# Patient Record
Sex: MALE | Race: WHITE | Employment: FULL TIME | URBAN - METROPOLITAN AREA
[De-identification: names, ages, dates, MRNs, and addresses within clinical notes are randomized per-mention and may not be internally consistent; named-entity substitution may affect disease eponyms.]

---

## 2022-08-30 ENCOUNTER — HOSPITAL ENCOUNTER (EMERGENCY)
Age: 42
Discharge: HOME | End: 2022-08-30
Payer: COMMERCIAL

## 2022-08-30 ENCOUNTER — HOSPITAL ENCOUNTER (EMERGENCY)
Age: 42
Discharge: HOME | End: 2022-08-30
Attending: STUDENT IN AN ORGANIZED HEALTH CARE EDUCATION/TRAINING PROGRAM
Payer: COMMERCIAL

## 2022-08-30 ENCOUNTER — APPOINTMENT (OUTPATIENT)
Dept: GENERAL RADIOLOGY | Age: 42
End: 2022-08-30
Payer: COMMERCIAL

## 2022-08-30 VITALS
DIASTOLIC BLOOD PRESSURE: 80 MMHG | SYSTOLIC BLOOD PRESSURE: 120 MMHG | RESPIRATION RATE: 18 BRPM | OXYGEN SATURATION: 99 % | TEMPERATURE: 98.7 F | HEIGHT: 70 IN | HEART RATE: 100 BPM | WEIGHT: 143 LBS | BODY MASS INDEX: 20.47 KG/M2

## 2022-08-30 VITALS
TEMPERATURE: 97.1 F | DIASTOLIC BLOOD PRESSURE: 76 MMHG | SYSTOLIC BLOOD PRESSURE: 120 MMHG | OXYGEN SATURATION: 96 % | RESPIRATION RATE: 16 BRPM | HEART RATE: 80 BPM

## 2022-08-30 DIAGNOSIS — S61.217A LACERATION OF LEFT LITTLE FINGER WITHOUT DAMAGE TO NAIL, FOREIGN BODY PRESENCE UNSPECIFIED, INITIAL ENCOUNTER: Primary | ICD-10-CM

## 2022-08-30 DIAGNOSIS — S61.209A OPEN WOUND OF FINGER WITH TENDON INJURY, INITIAL ENCOUNTER: ICD-10-CM

## 2022-08-30 DIAGNOSIS — S61.217A LACERATION OF LEFT LITTLE FINGER WITHOUT FOREIGN BODY WITHOUT DAMAGE TO NAIL, INITIAL ENCOUNTER: Primary | ICD-10-CM

## 2022-08-30 PROCEDURE — 90471 IMMUNIZATION ADMIN: CPT | Performed by: PHYSICIAN ASSISTANT

## 2022-08-30 PROCEDURE — 99284 EMERGENCY DEPT VISIT MOD MDM: CPT

## 2022-08-30 PROCEDURE — 99211 OFF/OP EST MAY X REQ PHY/QHP: CPT

## 2022-08-30 PROCEDURE — 90714 TD VACC NO PRESV 7 YRS+ IM: CPT | Performed by: PHYSICIAN ASSISTANT

## 2022-08-30 PROCEDURE — 73130 X-RAY EXAM OF HAND: CPT

## 2022-08-30 PROCEDURE — G0463 HOSPITAL OUTPT CLINIC VISIT: HCPCS

## 2022-08-30 PROCEDURE — 6360000002 HC RX W HCPCS: Performed by: PHYSICIAN ASSISTANT

## 2022-08-30 RX ORDER — LIDOCAINE HYDROCHLORIDE 10 MG/ML
20 INJECTION, SOLUTION INFILTRATION; PERINEURAL ONCE
Status: DISCONTINUED | OUTPATIENT
Start: 2022-08-30 | End: 2022-08-30 | Stop reason: HOSPADM

## 2022-08-30 RX ORDER — TETANUS AND DIPHTHERIA TOXOIDS ADSORBED 2; 2 [LF]/.5ML; [LF]/.5ML
0.5 INJECTION INTRAMUSCULAR ONCE
Status: COMPLETED | OUTPATIENT
Start: 2022-08-30 | End: 2022-08-30

## 2022-08-30 RX ORDER — CEPHALEXIN 500 MG/1
500 CAPSULE ORAL 4 TIMES DAILY
Qty: 28 CAPSULE | Refills: 0 | Status: ON HOLD | OUTPATIENT
Start: 2022-08-30 | End: 2022-09-06 | Stop reason: SDUPTHER

## 2022-08-30 RX ORDER — OXYCODONE HYDROCHLORIDE AND ACETAMINOPHEN 5; 325 MG/1; MG/1
1 TABLET ORAL EVERY 6 HOURS PRN
Qty: 12 TABLET | Refills: 0 | Status: SHIPPED | OUTPATIENT
Start: 2022-08-30 | End: 2022-09-02

## 2022-08-30 RX ADMIN — TETANUS AND DIPHTHERIA TOXOIDS ADSORBED 0.5 ML: 2; 2 INJECTION INTRAMUSCULAR at 14:33

## 2022-08-30 ASSESSMENT — PAIN DESCRIPTION - LOCATION: LOCATION: HAND

## 2022-08-30 ASSESSMENT — PAIN SCALES - GENERAL
PAINLEVEL_OUTOF10: 3
PAINLEVEL_OUTOF10: 4

## 2022-08-30 ASSESSMENT — PAIN - FUNCTIONAL ASSESSMENT: PAIN_FUNCTIONAL_ASSESSMENT: 0-10

## 2022-08-30 ASSESSMENT — PAIN DESCRIPTION - DESCRIPTORS: DESCRIPTORS: ACHING;BURNING;SORE

## 2022-08-30 ASSESSMENT — PAIN DESCRIPTION - ORIENTATION: ORIENTATION: LEFT

## 2022-08-30 NOTE — ED PROVIDER NOTES
[address] [facility]  Department of Emergency Medicine  UC Encounter Note  22   12:56 PM EDT      NAME: Parnell Angelucci  :  1980  MRN:  [id number]    Chief Complaint: Laceration (On left hand-cut it on steel at work )      This is a 81-year-old male the presents to urgent care with a coworker. He states this morning around 1130 he cut his hand on a piece of metal.  He is unsure of his last tetanus shot. He states that he has a laceration to the left fifth finger. He states difficulty moving the finger. He denies other injury. On first contact patient he appears to be in no acute distress. Review of Systems  Pertinent positives and negatives are stated within HPI, all other systems reviewed and are negative. Physical Exam  Skin:     Comments: Approximate 3.5 cm long laceration to the base of the proximal phalanx of the left fifth finger. Wound is very deep. It appears that the flexor tendon has been cut. Also there is bleeding that seems to be coming from a small arterial source. There is no cyanosis of the hand. I do not see any other injury on the hand. The bony structures of the hand appear to be intact. Neurological:      Comments: He does have weakness moving the left fifth finger. Procedures    MDM  Number of Diagnoses or Management Options  Laceration of left little finger without damage to nail, foreign body presence unspecified, initial encounter  Diagnosis management comments: He is in no acute distress. He is not dizzy or lightheaded. Initially I did inject a small amount of lidocaine without epinephrine to the base of the wound for localized anesthesia. He needs the resources of the emergency department. I was able to bandage up the wound and the bleeding is controlled at this time. There is no blood coming out of the bandage. I did speak to Dr. True Van regarding this deep laceration. He will accept the patient. Patient is stable here.   I did speak to the coworker who will drive him over there and he is stable to go by private vehicle I told him to go directly to [facility] for further evaluation and treatment.             --------------------------------------------- PAST HISTORY ---------------------------------------------  Past Medical History:  has no past medical history on file. Past Surgical History:  has no past surgical history on file. Social History:  reports that he has never smoked. He has never used smokeless tobacco.    Family History: family history is not on file. The patients home medications have been reviewed. Allergies: Patient has no known allergies. -------------------------------------------------- RESULTS -------------------------------------------------  No results found for this visit on 08/30/22. No orders to display       ------------------------- NURSING NOTES AND VITALS REVIEWED ---------------------------   The nursing notes within the ED encounter and vital signs as below have been reviewed. /80   Pulse 100   Temp 98.7 °F (37.1 °C)   Resp 18   Ht 5' 10\" (1.778 m)   Wt 143 lb (64.9 kg)   SpO2 99%   BMI 20.52 kg/m²   Oxygen Saturation Interpretation: Normal      ------------------------------------------ PROGRESS NOTES ------------------------------------------   I have spoken with the patient and co-worker  and discussed todays results, in addition to providing specific details for the plan of care and counseling regarding the diagnosis and prognosis. Their questions are answered at this time and they are agreeable with the plan.      --------------------------------- ADDITIONAL PROVIDER NOTES ---------------------------------     This patient is stable for discharge. I have shared the specific conditions for return, as well as the importance of follow-up. * NOTE: This report was transcribed using voice recognition software.  Every effort was made to ensure accuracy; however, inadvertent computerized transcription errors may be present.    --------------------------------- IMPRESSION AND DISPOSITION ---------------------------------    IMPRESSION  1.  Laceration of left little finger without damage to nail, foreign body presence unspecified, initial encounter        DISPOSITION  Disposition: Discharge to ED by car  Patient condition is stable       Rusty Dueñas PA-C  08/30/22 1300

## 2022-08-30 NOTE — ED PROVIDER NOTES
ED Attending shared visit  CC: No     Department of Emergency Medicine   ED  Encounter Note  Admit Date/RoomTime: 2022  1:38 PM  ED Room:     NAME: Valorie Donahue  : 1980  MRN: [id number]     Chief Complaint:  Laceration (Cut finger on piece of steel at work)    History of Present Illness       Kleber Walker [facility] Adilson is a 40 y.o. old male presenting to the emergency department by private vehicle, for a laceration to the proximal aspect of the proximal phalanx of the volar aspect of the left fifth finger , caused by metal edge, which occurred at work approximately 1 hour(s) prior to arrival.  There is not a possibility of retained foreign body in the affected area. Bleeding is not controlled. He takes no blood thinning agents. There is pain at injury site. Tetanus Status:  5 years and 5 days ago. ROS   Pertinent positives and negatives are stated within HPI, all other systems reviewed and are negative. Past Medical History:  has no past medical history on file. Surgical History:  has no past surgical history on file. Social History:  reports that he has never smoked. He has never used smokeless tobacco.    Family History: family history is not on file. Allergies: Patient has no known allergies. Physical Exam  Physical Exam   Oxygen Saturation Interpretation: Normal.        ED Triage Vitals   BP Temp Temp Source Heart Rate Resp SpO2 Height Weight   22 1316 22 1312 22 1312 22 1312 22 1312 22 1312 -- --   130/78 97.1 °F (36.2 °C) Oral (!) 101 20 93 %         Constitutional:  Alert, development consistent with age. HEENT:  NC/NT. Airway patent. Neck:  Normal ROM. Supple. Non-tender. Digits/Fingers:   Left proximal aspect of the proximal phalanx of the volar aspect of the left fifth finger            Tenderness:  severe. Swelling: none. Deformity: no deformity observed/palpated. ROM: unable to flex left 5th finger. Skin:  approximately 2 cm wound present. Neurovascular: Motor deficit: as above. Sensory deficit: decreased sensation distal to the laceration. Pulse deficit: none. Capillary refill: normal.  Hand:  Left all metacarpals            Tenderness:  none. Swelling: none. Deformity: no deformity observed/palpated. Skin:  no wounds, erythema, or swelling. Lymphatics: No lymphangitis or adenopathy noted. Neurological:  Oriented. Motor functions intact. Lab / Imaging Results   (All laboratory and radiology results have been personally reviewed by myself)  Labs:  No results found for this visit on 08/30/22. Imaging: All Radiology results interpreted by Radiologist unless otherwise noted. XR HAND RIGHT (MIN 3 VIEWS)    (Results Pending)     ED Course / Medical Decision Making     Medications   lidocaine 1 % injection 20 mL (has no administration in time range)   diptheria-tetanus toxoids (DECAVAC) 2-2 LF/0.5ML injection 0.5 mL (0.5 mLs IntraMUSCular Given 8/30/22 1433)      Consult(s):   IP CONSULT TO ORTHOPEDIC SURGERY I spoke with the ortho resident who saw the patient in the ED    Procedure(s):    Please refer to ortho note. MDM:   Patient presents to the emergency department for left fifth finger laceration. Both numbness and decreased range of motion to flexion of the affected finger were noticed on physical examination. Ortho was consulted, see their separate note. Patient will be given Keflex for antibiotic prophylaxis and Percocet as needed for pain. Patient educated on new meds to include potential side effects. Worker's Comp. paperwork filled out with restrictions. Patient should follow-up with both Dr. Tran, hand surgery, and corporate care. Wound care discussed. Signs of infection explained.   Strict return precautions were described and he should come back immediately for new or worsening symptoms. Plan of Care/Counseling:  Lbirado Mahan PA-C reviewed today's visit with the patient in addition to providing specific details for the plan of care and counseling regarding the diagnosis and prognosis. Questions are answered at this time and are agreeable with the plan. Assessment     1. Laceration of left little finger without foreign body without damage to nail, initial encounter    2. Open wound of finger with tendon injury, initial encounter      Plan   Discharged home. Patient condition is good    New Medications     New Prescriptions    CEPHALEXIN (KEFLEX) 500 MG CAPSULE    Take 1 capsule by mouth 4 times daily for 7 days    OXYCODONE-ACETAMINOPHEN (PERCOCET) 5-325 MG PER TABLET    Take 1 tablet by mouth every 6 hours as needed for Pain for up to 3 days. Electronically signed by Librado Mahan PA-C   DD: 8/30/22  **This report was transcribed using voice recognition software. Every effort was made to ensure accuracy; however, inadvertent computerized transcription errors may be present.   END OF ED PROVIDER NOTE        Librado Mahan PA-C  08/30/22 9871

## 2022-08-30 NOTE — PROGRESS NOTES
Consult received, laceration with tendon involvement. Awaiting x-rays of the hand. Full consult note to follow.

## 2022-08-30 NOTE — CONSULTS
Department of Orthopedic Surgery  Resident Consult Note          Reason for Consult: Flexor tendon injury, left small finger    HISTORY OF PRESENT ILLNESS:       Patient is a right-hand-dominant 40 y.o. male who presents with an apparent flexor tendon injury. Patient was initially seen at an outlying ED and was transferred to [place], [place] for orthopedic consultation as he was not able to actively flex the finger. He states he was at work and was pulling a heavy metal sheet out for another coworker, when suddenly the metal sheet gave way and lacerated through his protective glove. Laceration is at the base of the small finger on the palmar surface. Minimal active bleeding. Denies numbness/tingling/paresthesias. Denies any other orthopedic complaints at this time. When I arrived in the ED, the patient had already had lidocaine injected locally around the wound. Denies any other injuries. Past Medical History:    History reviewed. No pertinent past medical history. Past Surgical History:    History reviewed. No pertinent surgical history. Current Medications:   Current Facility-Administered Medications: lidocaine 1 % injection 20 mL, 20 mL, IntraDERmal, Once  Allergies:  Patient has no known allergies. Social History:   TOBACCO:   reports that he has never smoked. He has never used smokeless tobacco.  ETOH:   has no history on file for alcohol use. DRUGS:   has no history on file for drug use. ACTIVITIES OF DAILY LIVING:    OCCUPATION:    Family History:   History reviewed. No pertinent family history.     REVIEW OF SYSTEMS:  CONSTITUTIONAL:  negative for  fevers, chills  EYES:  negative for blurred vision, visual disturbance  HEENT:  negative for  hearing loss, voice change  RESPIRATORY:  negative for  dyspnea, wheezing  CARDIOVASCULAR:  negative for  chest pain, palpitations  GASTROINTESTINAL:  negative for nausea, vomiting  GENITOURINARY:  negative for frequency, urinary incontinence  HEMATOLOGIC/LYMPHATIC:  negative for bleeding and petechiae  MUSCULOSKELETAL:  positive for left small finger laceration, inability to flex small finger  NEUROLOGICAL:  negative for headaches, dizziness  BEHAVIOR/PSYCH:  negative for increased agitation and anxiety    PHYSICAL EXAM:    VITALS:  /78   Pulse (!) 101   Temp 97.1 °F (36.2 °C) (Oral)   Resp 18   SpO2 93%   CONSTITUTIONAL:  awake, alert, cooperative, no apparent distress, and appears stated age  MUSCULOSKELETAL:  Left upper Extremity:  2 cm laceration, extending from the palmar ulnar border of the left fifth digit to the palmar fourth webspace within the MCPJ flexion crease. Mild bleeding, controlled. Brisk capillary refill in all digits including the small finger  No active flexion at the MCP, PIP, or DIP joints  No tenodesis effect of the small finger when the wrist is passively extended  With the digit passively flexed, the patient does have full active extension  No gross contamination of the wound  Sensation to light touch is grossly intact on the radial and ulnar aspects of the palmar surface of the digit as well as over the dorsal surface    Secondary Exam:   No injuries on secondary exam    DATA:    CBC: No results found for: WBC, RBC, HGB, HCT, MCV, MCH, MCHC, RDW, PLT, MPV  PT/INR:  No results found for: PROTIME, INR    Radiology Review:  3 views of the left hand reviewed. No acute fractures or dislocations noted. The fifth digit is resting in extension on all images. No apparent osseous abnormalities. IMPRESSION:  Left fifth digit complete zone 2 flexor tendon laceration    PLAN:  The wound was thoroughly irrigated with 1 L of sterile saline. Patient had already had local anesthesia and tolerated wound exploration well without any further anesthetics given. The proximal aspect of the tendon had retracted and was not visible within the wound. There was no apparent arterial injury. No gross contamination.   Once the wound was thoroughly irrigated, 4-0 nylon suture was used to close the skin in a simple interrupted technique. A sterile dressing was then placed consisting of Xeroform, sterile 2 x 2's, and wrapped with Kerlix. The patient was then placed in a well-padded ulnar gutter splint that extended to the tip of the small finger  Initially discussed this patient's care with Dr. Sandra Martel, who recommended follow-up with Dr. Wilder Oliver. After discussion with Dr. Wilder Oliver, the patient was informed that he would follow-up in the orthopedic outpatient office this coming Thursday, 9/1. I explained to the patient the importance of prompt follow-up with Dr. Wilder Oliver, and explained that failure to follow-up with orthopedics and address his injury within the next 2 to 3 weeks could result in permanent finger stiffness, scarring of the tendons, further retraction of the tendon into the palm, and poor functional outcomes. The patient stated that he understood this and would be following up with orthopedics. He will call the office tomorrow to confirm his appointment. Patient okay to be discharged, antibiotics and pain control per ED.   Patient did have his tetanus updated while in the emergency department    Myrna Bailey DO , PGY-2  8/30/22

## 2022-08-30 NOTE — ED NOTES
Dry pressure dressing applied to wound. Patient's co-worker to transport to [address] [place] [address] ER via private vehicle.       Lia Meek LPN  02/60/26 6946

## 2022-09-01 ENCOUNTER — OFFICE VISIT (OUTPATIENT)
Dept: ORTHOPEDIC SURGERY | Age: 42
End: 2022-09-01
Payer: COMMERCIAL

## 2022-09-01 VITALS — BODY MASS INDEX: 20.04 KG/M2 | WEIGHT: 140 LBS | HEIGHT: 70 IN

## 2022-09-01 DIAGNOSIS — S61.219A LACERATION OF FINGER OF LEFT HAND WITH TENDON INVOLVEMENT, INITIAL ENCOUNTER: Primary | ICD-10-CM

## 2022-09-01 DIAGNOSIS — S64.40XA LACERATION OF DIGITAL NERVE OF FINGER, INITIAL ENCOUNTER: ICD-10-CM

## 2022-09-01 PROCEDURE — 99204 OFFICE O/P NEW MOD 45 MIN: CPT | Performed by: ORTHOPAEDIC SURGERY

## 2022-09-01 NOTE — PROGRESS NOTES
Department of Orthopedic Surgery  History and Physical      CHIEF COMPLAINT:  left finger laceration     HISTORY OF PRESENT ILLNESS:                The patient is a 40 y.o. male who presents with laceration to left finger. Occurred at work on 8/30/2022. Patient is right-hand dominant. 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 States that he had a sheet metal get stuck in a press and was attempting to pull it out with a sheet metal shot out and hit the left little digit roughly the MCP joint. Went to [facility] emergency hospital and was evaluated by orthopedic resident at the time. Was diagnosed with a laceration of the flexor tendon. No other injuries at the time. Does endorse some minimal tingling and decreased sensation when compared to contralateral extremity on the volar surface as well as slightly on the ulnar and radial border and tip of the digit. Otherwise denies any other orthopedic complaints. Denies diabetes or tobacco use. Past Medical History:    No past medical history on file. Past Surgical History:    No past surgical history on file. Current Medications:   No current facility-administered medications for this visit. Allergies:  Patient has no known allergies. Social History:   TOBACCO:   reports that he has never smoked. He has never used smokeless tobacco.  ETOH:   has no history on file for alcohol use. DRUGS:   has no history on file for drug use. ACTIVITIES OF DAILY LIVING:    OCCUPATION:    Family History:   No family history on file.     REVIEW OF SYSTEMS:  CONSTITUTIONAL:  negative  EYES:  negative for  visual disturbance  RESPIRATORY:  negative for  dyspnea  CARDIOVASCULAR:  negative for  chest pain  GASTROINTESTINAL:  negative for nausea and vomiting  INTEGUMENT/BREAST:  negative for rash  ENDOCRINE:  negative for diabetic symptoms   MUSCULOSKELETAL:  positive for  pain and decreased range of motion  NEUROLOGICAL:  positive for numbness and tingling    PHYSICAL EXAM:    VITALS:  Ht 5' 10\" (1.778 m)   Wt 140 lb (63.5 kg)   BMI 20.09 kg/m²   CONSTITUTIONAL:  awake, alert, cooperative, no apparent distress, and appears stated age  EYES:  Lids and lashes normal, pupils equal, round and reactive to light, extra ocular muscles intact, sclera clear, conjunctiva normal  ENT:  Normocephalic, without obvious abnormality, atraumatic, sinuses nontender on palpation, external ears without lesions, oral pharynx with moist mucus membranes, tonsils without erythema or exudates, gums normal and good dentition. NECK:  Supple, symmetrical, trachea midline, no adenopathy, thyroid symmetric, not enlarged and no tenderness, skin normal  LUNGS:  CTA  CARDIOVASCULAR:  2+ radial pulses, extremities warm and well perfused  ABDOMEN:  not distended, NTTP  CHEST:  Atraumatic   GENITAL/URINARY:  deferred  NEUROLOGIC:  Awake, alert, oriented to name, place and time. Cranial nerves II-XII are grossly intact. Motor is 5 out of 5 bilaterally. Sensory is intact.  gait is normal.  MUSCULOSKELETAL:    Non-tender about the shoulder and elbow with good ROM. - tinels of the cubital tunnel, - tinels of the carpal tunnel, - durkans, - finkelsteins, - CMC grind, - tenderness over the A1 pulleys with no active triggering. Patient with laceration has been repaired at zone 2 of the little digit. Roughly 1/2 cm straight through the volar flexion crease at this level. Patient was in no flexion present at the DIP or at the PIP at this level. Full flexion extension of all the other fingers. Decreased sensation on the ulnar border of the little digit and decreased two-point discrimination. Sensation intact on the volar surface and is about the radial surface. .  Radial and ulnar sensation intact in all other digits. - wartenbergs and cross finger testing, APB strength 5/5 with no atrophy. Median, ulnar, radial n intact to light touch. Brisk capillary refill. Gross motor 5/5.       DATA: CBC: No results found for: WBC, RBC, HGB, HCT, MCV, MCH, MCHC, RDW, PLT, MPV  PT/INR:  No results found for: PROTIME, INR    Radiology Review:  Xray: x-rays of the left hand were obtained 8/30/22 and reviewed with the patient. 3 views: Left hand: demonstrate no acute fractures or dislocations, some air indicative of soft tissue injury on the volar surface of the little finger  Impression: No acute fracture dislocations    IMPRESSION:  Left zone 2 flexor tendon injury with associated 1cm laceration on the volar surface  Associated ulnar neurovascular injury little finger  Work related injury    PLAN:  Will require operative intervention. Discussed this with patient is in agreement. Patient placed back into ulnar gutter splint. Maintain non weight bearing to left upper extremity. Electronically signed by Allison Vallecillo DO on 9/1/2022 at 10:05 AM     I have seen and evaluated the patient and agree with the above assessment and plan on today's visit. I have performed the key components of the history and physical examination with significant findings of left little finger zone 2 flexor tendon laceration with ulnar neurovascular bundle injury. His findings were explained. Recommended surgical intervention for tendon repair and nerve repair as needed. Postop course was explained including skilled therapy need for compliance with restrictions and possible need for additional surgery. . I concur with the findings and plan as documented. I explained the risks, benefits, alternatives and complications of surgery with the patient including but not limited to the risks of infection, possible damage to nerves, vessels, or tendons, stiffness, loss of range of motion, scar sensitivity, wound healing complications, worsening symptoms, possible need for therapy, as well as the possible need further surgery and unanticipated complications. The patient voiced understanding and all questions were answered.  The patient elected to proceed with surgical intervention.      Tash Valles MD  9/1/2022

## 2022-09-02 ENCOUNTER — PREP FOR PROCEDURE (OUTPATIENT)
Dept: ORTHOPEDIC SURGERY | Age: 42
End: 2022-09-02

## 2022-09-02 DIAGNOSIS — S64.40XA LACERATION OF DIGITAL NERVE OF FINGER, INITIAL ENCOUNTER: ICD-10-CM

## 2022-09-02 DIAGNOSIS — S61.219A LACERATION OF FINGER OF LEFT HAND WITH TENDON INVOLVEMENT, INITIAL ENCOUNTER: Primary | ICD-10-CM

## 2022-09-02 RX ORDER — SODIUM CHLORIDE 9 MG/ML
INJECTION, SOLUTION INTRAVENOUS PRN
Status: CANCELLED | OUTPATIENT
Start: 2022-09-02

## 2022-09-02 RX ORDER — SODIUM CHLORIDE 0.9 % (FLUSH) 0.9 %
5-40 SYRINGE (ML) INJECTION PRN
Status: CANCELLED | OUTPATIENT
Start: 2022-09-02

## 2022-09-02 RX ORDER — SODIUM CHLORIDE 0.9 % (FLUSH) 0.9 %
5-40 SYRINGE (ML) INJECTION EVERY 12 HOURS SCHEDULED
Status: CANCELLED | OUTPATIENT
Start: 2022-09-02

## 2022-09-02 RX ORDER — SODIUM CHLORIDE 9 MG/ML
INJECTION, SOLUTION INTRAVENOUS CONTINUOUS
Status: CANCELLED | OUTPATIENT
Start: 2022-09-02

## 2022-09-02 NOTE — PROGRESS NOTES
Mp PRE-ADMISSION TESTING INSTRUCTIONS    The Preadmission Testing patient is instructed accordingly using the following criteria (check applicable):    ARRIVAL INSTRUCTIONS:  [x] Parking the day of Surgery is located in the Main Entrance lot. Upon entering the door, make an immediate right to the surgery reception desk    [x] Bring photo ID and insurance card    [x] Bring in a copy of Living will or Durable Power of  papers. [x] Please be sure to arrange for responsible adult to provide transportation to and from the hospital    [x] Please arrange for responsible adult to be with you for the 24 hour period post procedure due to having anesthesia    [x] If you awake am of surgery not feeling well or have temperature >100 please call [phone]    GENERAL INSTRUCTIONS:    [x] Nothing by mouth after midnight, including gum, candy, mints or water    [x] You may brush your teeth, but do not swallow any water    [] Take medications as instructed with 1-2 oz of water    [x] Stop herbal supplements and vitamins 5 days prior to procedure    [] Follow preop dosing of blood thinners per physician instructions    [] Take 1/2 dose of evening insulin, but no insulin after midnight    [] No oral diabetic medications after midnight    [] If diabetic and have low blood sugar or feel symptomatic, take 1-2oz apple juice only    [] Bring inhalers day of surgery    [] Bring C-PAP/ Bi-Pap day of surgery    [] Bring urine specimen day of surgery    [x] Shower or bath with soap, lather and rinse well, AM of Surgery, no lotion, powders or creams to surgical site    [] Follow bowel prep as instructed per surgeon    [x] No tobacco products within 24 hours of surgery     [x] No alcohol or illegal drug use within 24 hours of surgery.     [x] Jewelry, body piercing's, eyeglasses, contact lenses and dentures are not permitted into surgery (bring cases)      [x] Please do not wear any nail polish, make up or hair products on the day of surgery    [x] You can expect a call the business day prior to procedure to notify you if your arrival time changes    [x] If you receive a survey after surgery we would greatly appreciate your comments    [] Parent/guardian of a minor must accompany their child and remain on the premises  the entire time they are under our care     [] Pediatric patients may bring favorite toy, blanket or comfort item with them    [] A caregiver or family member must remain with the patient during their stay if they are mentally handicapped, have dementia, disoriented or unable to use a call light or would be a safety concern if left unattended    [x] Please notify surgeon if you develop any illness between now and time of surgery (cold, cough, sore throat, fever, nausea, vomiting) or any signs of infections  including skin, wounds, and dental.    [x]  The Outpatient Pharmacy is available to fill your prescription here on your day of surgery, ask your preop nurse for details    [] Other instructions    EDUCATIONAL MATERIALS PROVIDED:    [] PAT Preoperative Education Packet/Booklet     [] Medication List    [] Transfusion bracelet applied with instructions    [] Shower with soap, lather and rinse well, and use CHG wipes provided the evening before surgery as instructed    [] Incentive spirometer with instructions

## 2022-09-03 ENCOUNTER — ANESTHESIA EVENT (OUTPATIENT)
Dept: OPERATING ROOM | Age: 42
End: 2022-09-03
Payer: COMMERCIAL

## 2022-09-06 ENCOUNTER — HOSPITAL ENCOUNTER (OUTPATIENT)
Age: 42
Setting detail: OUTPATIENT SURGERY
Discharge: HOME | End: 2022-09-06
Attending: ORTHOPAEDIC SURGERY | Admitting: ORTHOPAEDIC SURGERY
Payer: COMMERCIAL

## 2022-09-06 ENCOUNTER — ANESTHESIA (OUTPATIENT)
Dept: OPERATING ROOM | Age: 42
End: 2022-09-06
Payer: COMMERCIAL

## 2022-09-06 VITALS
HEIGHT: 70 IN | TEMPERATURE: 98.1 F | SYSTOLIC BLOOD PRESSURE: 115 MMHG | RESPIRATION RATE: 16 BRPM | OXYGEN SATURATION: 99 % | DIASTOLIC BLOOD PRESSURE: 69 MMHG | WEIGHT: 140 LBS | HEART RATE: 84 BPM | BODY MASS INDEX: 20.04 KG/M2

## 2022-09-06 DIAGNOSIS — G89.18 POST-OPERATIVE PAIN: Primary | ICD-10-CM

## 2022-09-06 PROCEDURE — 64831 REPAIR OF DIGIT NERVE: CPT | Performed by: ORTHOPAEDIC SURGERY

## 2022-09-06 PROCEDURE — 6360000002 HC RX W HCPCS: Performed by: NURSE ANESTHETIST, CERTIFIED REGISTERED

## 2022-09-06 PROCEDURE — 7100000000 HC PACU RECOVERY - FIRST 15 MIN: Performed by: ORTHOPAEDIC SURGERY

## 2022-09-06 PROCEDURE — 3600000012 HC SURGERY LEVEL 2 ADDTL 15MIN: Performed by: ORTHOPAEDIC SURGERY

## 2022-09-06 PROCEDURE — 2709999900 HC NON-CHARGEABLE SUPPLY: Performed by: ORTHOPAEDIC SURGERY

## 2022-09-06 PROCEDURE — 7100000001 HC PACU RECOVERY - ADDTL 15 MIN: Performed by: ORTHOPAEDIC SURGERY

## 2022-09-06 PROCEDURE — 2580000003 HC RX 258: Performed by: NURSE ANESTHETIST, CERTIFIED REGISTERED

## 2022-09-06 PROCEDURE — 3600000002 HC SURGERY LEVEL 2 BASE: Performed by: ORTHOPAEDIC SURGERY

## 2022-09-06 PROCEDURE — 7100000011 HC PHASE II RECOVERY - ADDTL 15 MIN: Performed by: ORTHOPAEDIC SURGERY

## 2022-09-06 PROCEDURE — 3700000000 HC ANESTHESIA ATTENDED CARE: Performed by: ORTHOPAEDIC SURGERY

## 2022-09-06 PROCEDURE — 2500000003 HC RX 250 WO HCPCS: Performed by: NURSE ANESTHETIST, CERTIFIED REGISTERED

## 2022-09-06 PROCEDURE — 7100000010 HC PHASE II RECOVERY - FIRST 15 MIN: Performed by: ORTHOPAEDIC SURGERY

## 2022-09-06 PROCEDURE — 6360000002 HC RX W HCPCS: Performed by: PHYSICIAN ASSISTANT

## 2022-09-06 PROCEDURE — 2500000003 HC RX 250 WO HCPCS: Performed by: ORTHOPAEDIC SURGERY

## 2022-09-06 PROCEDURE — 26356 REPAIR FINGER/HAND TENDON: CPT | Performed by: ORTHOPAEDIC SURGERY

## 2022-09-06 PROCEDURE — 3700000001 HC ADD 15 MINUTES (ANESTHESIA): Performed by: ORTHOPAEDIC SURGERY

## 2022-09-06 PROCEDURE — 2580000003 HC RX 258: Performed by: PHYSICIAN ASSISTANT

## 2022-09-06 PROCEDURE — 69990 MICROSURGERY ADD-ON: CPT | Performed by: ORTHOPAEDIC SURGERY

## 2022-09-06 RX ORDER — SODIUM CHLORIDE 0.9 % (FLUSH) 0.9 %
5-40 SYRINGE (ML) INJECTION PRN
Status: DISCONTINUED | OUTPATIENT
Start: 2022-09-06 | End: 2022-09-06 | Stop reason: HOSPADM

## 2022-09-06 RX ORDER — DEXAMETHASONE SODIUM PHOSPHATE 4 MG/ML
INJECTION, SOLUTION INTRA-ARTICULAR; INTRALESIONAL; INTRAMUSCULAR; INTRAVENOUS; SOFT TISSUE PRN
Status: DISCONTINUED | OUTPATIENT
Start: 2022-09-06 | End: 2022-09-06 | Stop reason: SDUPTHER

## 2022-09-06 RX ORDER — SODIUM CHLORIDE 9 MG/ML
INJECTION, SOLUTION INTRAVENOUS CONTINUOUS
Status: DISCONTINUED | OUTPATIENT
Start: 2022-09-06 | End: 2022-09-06 | Stop reason: HOSPADM

## 2022-09-06 RX ORDER — LIDOCAINE HYDROCHLORIDE 20 MG/ML
INJECTION, SOLUTION EPIDURAL; INFILTRATION; INTRACAUDAL; PERINEURAL PRN
Status: DISCONTINUED | OUTPATIENT
Start: 2022-09-06 | End: 2022-09-06 | Stop reason: SDUPTHER

## 2022-09-06 RX ORDER — OXYCODONE HYDROCHLORIDE AND ACETAMINOPHEN 5; 325 MG/1; MG/1
1 TABLET ORAL EVERY 6 HOURS PRN
COMMUNITY

## 2022-09-06 RX ORDER — BUPIVACAINE HYDROCHLORIDE 5 MG/ML
INJECTION, SOLUTION EPIDURAL; INTRACAUDAL PRN
Status: DISCONTINUED | OUTPATIENT
Start: 2022-09-06 | End: 2022-09-06 | Stop reason: ALTCHOICE

## 2022-09-06 RX ORDER — ONDANSETRON 2 MG/ML
INJECTION INTRAMUSCULAR; INTRAVENOUS PRN
Status: DISCONTINUED | OUTPATIENT
Start: 2022-09-06 | End: 2022-09-06 | Stop reason: SDUPTHER

## 2022-09-06 RX ORDER — SODIUM CHLORIDE 9 MG/ML
INJECTION, SOLUTION INTRAVENOUS CONTINUOUS PRN
Status: DISCONTINUED | OUTPATIENT
Start: 2022-09-06 | End: 2022-09-06 | Stop reason: SDUPTHER

## 2022-09-06 RX ORDER — SODIUM CHLORIDE 9 MG/ML
INJECTION, SOLUTION INTRAVENOUS PRN
Status: DISCONTINUED | OUTPATIENT
Start: 2022-09-06 | End: 2022-09-06 | Stop reason: HOSPADM

## 2022-09-06 RX ORDER — SODIUM CHLORIDE 0.9 % (FLUSH) 0.9 %
5-40 SYRINGE (ML) INJECTION EVERY 12 HOURS SCHEDULED
Status: DISCONTINUED | OUTPATIENT
Start: 2022-09-06 | End: 2022-09-06 | Stop reason: HOSPADM

## 2022-09-06 RX ORDER — CEPHALEXIN 500 MG/1
500 CAPSULE ORAL 4 TIMES DAILY
COMMUNITY

## 2022-09-06 RX ORDER — PROPOFOL 10 MG/ML
INJECTION, EMULSION INTRAVENOUS PRN
Status: DISCONTINUED | OUTPATIENT
Start: 2022-09-06 | End: 2022-09-06 | Stop reason: SDUPTHER

## 2022-09-06 RX ORDER — MIDAZOLAM HYDROCHLORIDE 1 MG/ML
INJECTION INTRAMUSCULAR; INTRAVENOUS PRN
Status: DISCONTINUED | OUTPATIENT
Start: 2022-09-06 | End: 2022-09-06 | Stop reason: SDUPTHER

## 2022-09-06 RX ORDER — OXYCODONE HYDROCHLORIDE AND ACETAMINOPHEN 5; 325 MG/1; MG/1
1 TABLET ORAL EVERY 6 HOURS PRN
Qty: 28 TABLET | Refills: 0 | Status: SHIPPED | OUTPATIENT
Start: 2022-09-06 | End: 2022-09-13

## 2022-09-06 RX ORDER — CEPHALEXIN 500 MG/1
500 CAPSULE ORAL 4 TIMES DAILY
Qty: 28 CAPSULE | Refills: 0 | Status: SHIPPED | OUTPATIENT
Start: 2022-09-06 | End: 2022-09-13

## 2022-09-06 RX ORDER — FENTANYL CITRATE 50 UG/ML
INJECTION, SOLUTION INTRAMUSCULAR; INTRAVENOUS PRN
Status: DISCONTINUED | OUTPATIENT
Start: 2022-09-06 | End: 2022-09-06 | Stop reason: SDUPTHER

## 2022-09-06 RX ORDER — PROCHLORPERAZINE EDISYLATE 5 MG/ML
5 INJECTION INTRAMUSCULAR; INTRAVENOUS
Status: DISCONTINUED | OUTPATIENT
Start: 2022-09-06 | End: 2022-09-06 | Stop reason: HOSPADM

## 2022-09-06 RX ADMIN — DEXAMETHASONE SODIUM PHOSPHATE 10 MG: 4 INJECTION, SOLUTION INTRAMUSCULAR; INTRAVENOUS at 09:10

## 2022-09-06 RX ADMIN — CEFAZOLIN 2000 MG: 2 INJECTION, POWDER, FOR SOLUTION INTRAMUSCULAR; INTRAVENOUS at 09:05

## 2022-09-06 RX ADMIN — SODIUM CHLORIDE: 9 INJECTION, SOLUTION INTRAVENOUS at 09:00

## 2022-09-06 RX ADMIN — PROPOFOL 50 MG: 10 INJECTION, EMULSION INTRAVENOUS at 09:19

## 2022-09-06 RX ADMIN — LIDOCAINE HYDROCHLORIDE 100 MG: 20 INJECTION, SOLUTION EPIDURAL; INFILTRATION; INTRACAUDAL; PERINEURAL at 09:10

## 2022-09-06 RX ADMIN — PROPOFOL 250 MG: 10 INJECTION, EMULSION INTRAVENOUS at 09:10

## 2022-09-06 RX ADMIN — FENTANYL CITRATE 100 MCG: 50 INJECTION, SOLUTION INTRAMUSCULAR; INTRAVENOUS at 09:10

## 2022-09-06 RX ADMIN — MIDAZOLAM 2 MG: 1 INJECTION INTRAMUSCULAR; INTRAVENOUS at 09:05

## 2022-09-06 RX ADMIN — ONDANSETRON 4 MG: 2 INJECTION INTRAMUSCULAR; INTRAVENOUS at 09:10

## 2022-09-06 ASSESSMENT — PAIN SCALES - GENERAL
PAINLEVEL_OUTOF10: 0

## 2022-09-06 ASSESSMENT — PAIN - FUNCTIONAL ASSESSMENT: PAIN_FUNCTIONAL_ASSESSMENT: 0-10

## 2022-09-06 NOTE — ANESTHESIA PRE PROCEDURE
Department of Anesthesiology  Preprocedure Note       Name:  Radha Bishop   Age:  40 y.o.  :  1980                                          MRN:  [id number]         Date:  2022      Surgeon: Bernadette Agustin):  Erasmo Yanes MD    Procedure: Procedure(s):  LEFT SMALL FINGER FLEXOR TENDON AND NERVE REPAIR AS NEEDED    (MICROINSTRUMENTS)    Medications prior to admission:   Prior to Admission medications    Medication Sig Start Date End Date Taking? Authorizing Provider   cephALEXin (KEFLEX) 500 MG capsule Take 1 capsule by mouth 4 times daily for 7 days 22 Yes YOHAN Sherwood   oxyCODONE-acetaminophen (PERCOCET) 5-325 MG per tablet Take 1 tablet by mouth every 6 hours as needed for Pain for up to 7 days. 22 Yes YOHAN Wade   cephALEXin (KEFLEX) 500 MG capsule Take 500 mg by mouth 4 times daily   Yes Historical Provider, MD   oxyCODONE-acetaminophen (PERCOCET) 5-325 MG per tablet Take 1 tablet by mouth every 6 hours as needed for Pain. Yes Historical Provider, MD       Current medications:    Current Facility-Administered Medications   Medication Dose Route Frequency Provider Last Rate Last Admin    0.9 % sodium chloride infusion   IntraVENous Continuous YOHAN Sherwood   New Bag at [phone]    sodium chloride flush 0.9 % injection 5-40 mL  5-40 mL IntraVENous 2 times per day YOHAN Sherwood        sodium chloride flush 0.9 % injection 5-40 mL  5-40 mL IntraVENous PRN YOHAN Sherwood        0.9 % sodium chloride infusion   IntraVENous PRN YOHAN Sherwood        ceFAZolin (ANCEF) 2,000 mg in sterile water 20 mL IV syringe  2,000 mg IntraVENous On Call to [address] YOH[address]           Allergies:  No Known Allergies    Problem List:  There is no problem list on file for this patient.       Past Medical History:        Diagnosis Date    Finger laceration involving tendon     left small finger with tendon and nerve involvement       Past Surgical History: History reviewed. No pertinent surgical history. Social History:    Social History     Tobacco Use    Smoking status: Never    Smokeless tobacco: Never   Substance Use Topics    Alcohol use: Never                                Counseling given: Not Answered      Vital Signs (Current):   Vitals:    09/02/22 1234 09/06/22 0840   BP:  117/72   Pulse:  90   Resp:  18   Temp:  98 °F (36.7 °C)   TempSrc:  Temporal   SpO2:  100%   Weight: 140 lb (63.5 kg) 140 lb (63.5 kg)   Height: 5' 10\" (1.778 m) 5' 10\" (1.778 m)                                              BP Readings from Last 3 Encounters:   09/06/22 117/72   08/30/22 120/76   08/30/22 120/80       NPO Status: Time of last liquid consumption: 2230                        Time of last solid consumption: 1900                        Date of last liquid consumption: 09/05/22                        Date of last solid food consumption: 09/05/22    BMI:   Wt Readings from Last 3 Encounters:   09/06/22 140 lb (63.5 kg)   09/01/22 140 lb (63.5 kg)   08/30/22 143 lb (64.9 kg)     Body mass index is 20.09 kg/m². CBC: No results found for: WBC, RBC, HGB, HCT, MCV, RDW, PLT    CMP: No results found for: NA, K, CL, CO2, BUN, CREATININE, GFRAA, AGRATIO, LABGLOM, GLUCOSE, GLU, PROT, CALCIUM, BILITOT, ALKPHOS, AST, ALT    POC Tests: No results for input(s): POCGLU, POCNA, POCK, POCCL, POCBUN, POCHEMO, POCHCT in the last 72 hours.     Coags: No results found for: PROTIME, INR, APTT    HCG (If Applicable): No results found for: PREGTESTUR, PREGSERUM, HCG, HCGQUANT     ABGs: No results found for: PHART, PO2ART, DKC0OVU, DYF9PPQ, BEART, S8FMISQV     Type & Screen (If Applicable):  No results found for: LABABO, LABRH    Drug/Infectious Status (If Applicable):  No results found for: HIV, HEPCAB    COVID-19 Screening (If Applicable): No results found for: COVID19        Anesthesia Evaluation  Patient summary reviewed and Nursing notes reviewed no history of anesthetic complications:

## 2022-09-06 NOTE — BRIEF OP NOTE
Brief Postoperative Note      Patient: Aaron Adame  YOB: 1980  MRN: [id number]    Date of Procedure: 9/6/2022    Pre-Op Diagnosis: Laceration of finger of left hand, foreign body presence unspecified, nail damage status unspecified, unspecified finger, initial encounter [S61.219A]    Post-Op Diagnosis: Same       Procedure(s):  LEFT SMALL FINGER FLEXOR TENDON AND NERVE REPAIR AS NEEDED    (MICROINSTRUMENTS)    Surgeon(s):  Edilma Roy MD    Assistant:  Physician Assistant: YOHAN Lr  Resident:  Daryle Loots, DO    Anesthesia: General    Estimated Blood Loss (mL): Minimal    Complications: None    Specimens:   * No specimens in log *    Implants:  * No implants in log *      Drains: * No LDAs found *    Findings: see op note    Electronically signed by YOHAN Lr on 9/6/2022 at 10:15 AM

## 2022-09-06 NOTE — ANESTHESIA POSTPROCEDURE EVALUATION
Department of Anesthesiology  Postprocedure Note    Patient: Eliel Lema  MRN: [id number]  [place]: 1980  Date of evaluation: 9/6/2022      Procedure Summary     Date: 09/06/22 Room / Location: [facility] 02 / [address] [place] [address]    Anesthesia Start: 0900 Anesthesia Stop:     Procedure: LEFT SMALL FINGER FLEXOR TENDON AND NERVE REPAIR AS NEEDED    (MICROINSTRUMENTS) (Left) Diagnosis:       Laceration of finger of left hand, foreign body presence unspecified, nail damage status unspecified, unspecified finger, initial encounter      (Laceration of finger of left hand, foreign body presence unspecified, nail damage status unspecified, unspecified finger, initial encounter [O03.138W])    Surgeons: Aristides Isaac MD Responsible Provider: Eric Major MD    Anesthesia Type: general ASA Status: 1          Anesthesia Type: No value filed.     Haresh Phase I: Haresh Score: 10    Haresh Phase II:        Anesthesia Post Evaluation    Patient location during evaluation: PACU  Patient participation: complete - patient participated  Level of consciousness: awake  Airway patency: patent  Nausea & Vomiting: no nausea and no vomiting  Complications: no  Cardiovascular status: hemodynamically stable  Respiratory status: acceptable  Hydration status: euvolemic

## 2022-09-06 NOTE — H&P
Department of Orthopedic Surgery  History and Physical      CHIEF COMPLAINT:  left finger laceration     HISTORY OF PRESENT ILLNESS:                The patient is a 40 y.o. male who presents with laceration to left finger. Occurred at work on 8/30/2022. Patient is right-hand dominant. 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 States that he had a sheet metal get stuck in a press and was attempting to pull it out with a sheet metal shot out and hit the left little digit roughly the MCP joint. Went to [facility] emergency hospital and was evaluated by orthopedic resident at the time. Was diagnosed with a laceration of the flexor tendon. No other injuries at the time. Does endorse some minimal tingling and decreased sensation when compared to contralateral extremity on the volar surface as well as slightly on the ulnar and radial border and tip of the digit. Otherwise denies any other orthopedic complaints. Denies diabetes or tobacco use. Past Medical History:        Diagnosis Date    Finger laceration involving tendon     left small finger with tendon and nerve involvement     Past Surgical History:    History reviewed. No pertinent surgical history. Current Medications:   Current Facility-Administered Medications: 0.9 % sodium chloride infusion, , IntraVENous, Continuous  sodium chloride flush 0.9 % injection 5-40 mL, 5-40 mL, IntraVENous, 2 times per day  sodium chloride flush 0.9 % injection 5-40 mL, 5-40 mL, IntraVENous, PRN  0.9 % sodium chloride infusion, , IntraVENous, PRN  ceFAZolin (ANCEF) 2,000 mg in sterile water 20 mL IV syringe, 2,000 mg, IntraVENous, On Call to OR  Allergies:  Patient has no known allergies. Social History:   TOBACCO:   reports that he has never smoked. He has never used smokeless tobacco.  ETOH:   reports no history of alcohol use. DRUGS:   reports no history of drug use.   ACTIVITIES OF DAILY LIVING:    OCCUPATION:    Family History:   History reviewed. No pertinent family history. REVIEW OF SYSTEMS:  CONSTITUTIONAL:  negative  EYES:  negative for  visual disturbance  RESPIRATORY:  negative for  dyspnea  CARDIOVASCULAR:  negative for  chest pain  GASTROINTESTINAL:  negative for nausea and vomiting  INTEGUMENT/BREAST:  negative for rash  ENDOCRINE:  negative for diabetic symptoms   MUSCULOSKELETAL:  positive for  pain and decreased range of motion  NEUROLOGICAL:  positive for numbness and tingling    PHYSICAL EXAM:    VITALS:  /72   Pulse 90   Resp 18   Ht 5' 10\" (1.778 m)   Wt 140 lb (63.5 kg)   SpO2 100%   BMI 20.09 kg/m²   CONSTITUTIONAL:  awake, alert, cooperative, no apparent distress, and appears stated age  EYES:  Lids and lashes normal, pupils equal, round and reactive to light, extra ocular muscles intact, sclera clear, conjunctiva normal  ENT:  Normocephalic, without obvious abnormality, atraumatic, sinuses nontender on palpation, external ears without lesions, oral pharynx with moist mucus membranes, tonsils without erythema or exudates, gums normal and good dentition. NECK:  Supple, symmetrical, trachea midline, no adenopathy, thyroid symmetric, not enlarged and no tenderness, skin normal  LUNGS:  CTA  CARDIOVASCULAR:  2+ radial pulses, extremities warm and well perfused  ABDOMEN:  not distended, NTTP  CHEST:  Atraumatic   GENITAL/URINARY:  deferred  NEUROLOGIC:  Awake, alert, oriented to name, place and time. Cranial nerves II-XII are grossly intact. Motor is 5 out of 5 bilaterally. Sensory is intact.  gait is normal.  MUSCULOSKELETAL:    Non-tender about the shoulder and elbow with good ROM. - tinels of the cubital tunnel, - tinels of the carpal tunnel, - durkans, - finkelsteins, - CMC grind, - tenderness over the A1 pulleys with no active triggering. Patient with laceration has been repaired at zone 2 of the little digit. Roughly 1/2 cm straight through the volar flexion crease at this level.   Patient was in no flexion present at the DIP or at the PIP at this level. Full flexion extension of all the other fingers. Decreased sensation on the ulnar border of the little digit and decreased two-point discrimination. Sensation intact on the volar surface and is about the radial surface. .  Radial and ulnar sensation intact in all other digits. - wartenbergs and cross finger testing, APB strength 5/5 with no atrophy. Median, ulnar, radial n intact to light touch. Brisk capillary refill. Gross motor 5/5. DATA:    CBC: No results found for: WBC, RBC, HGB, HCT, MCV, MCH, MCHC, RDW, PLT, MPV  PT/INR:  No results found for: PROTIME, INR    Radiology Review:  Xray: x-rays of the left hand were obtained 8/30/22 and reviewed with the patient. 3 views: Left hand: demonstrate no acute fractures or dislocations, some air indicative of soft tissue injury on the volar surface of the little finger  Impression: No acute fracture dislocations    IMPRESSION:  Left zone 2 flexor tendon injury with associated 1cm laceration on the volar surface  Associated ulnar neurovascular injury little finger  Work related injury    PLAN:  Will require operative intervention. Discussed this with patient is in agreement. Patient placed back into ulnar gutter splint. Maintain non weight bearing to left upper extremity. Electronically signed by Juan Luis Hughes DO on 9/6/2022 at 8:44 AM     I have seen and evaluated the patient and agree with the above assessment and plan on today's visit. I have performed the key components of the history and physical examination with significant findings of left little finger zone 2 flexor tendon laceration with ulnar neurovascular bundle injury. His findings were explained. Recommended surgical intervention for tendon repair and nerve repair as needed. Postop course was explained including skilled therapy need for compliance with restrictions and possible need for additional surgery. . I concur with the findings and

## 2022-09-06 NOTE — PROGRESS NOTES
CLINICAL PHARMACY NOTE: MEDS TO BEDS    Total # of Prescriptions Filled: 2   The following medications were delivered to the patient:  Percocet 5/325  Keflex     Additional Documentation:  Patient took kefkex script back.  Said the dr said they no longer needed the antibiotic just the pain med

## 2022-09-06 NOTE — DISCHARGE INSTRUCTIONS
DISCHARGE INSTRUCTIONS TO HOME FOLLOWING SURGERY    YOU NEED TO START THERAPY WITHIN A FEW DAYS. CONTACT OUR OFFICE IF THIS IS NOT SET UP    ACTIVITY INSTRUCTIONS:    Elevate extremity to help reduce swelling. Use Purple Pillow for elevation of hand/arm   Use sling as needed. No heavy lifting, pushing, pulling or strenuous activity    WOUND/DRESSING INSTRUCTIONS:  Always ensure you and your care giver clean hands before and after caring for the wound. Keep the dressing, cast, or splint clean and dry until seen in THERAPY. OK to shower- Keep your dressing dry. Can ice surgical region to help reduce swelling, Do not apply ice to fingers or toes       MEDICATION INSTRUCTIONS:  Take pain medicine as directed. When taking pain medications, you may experience dizziness or drowsiness. Do not drink alcohol or drive when taking these medications. Do not take any other medication containing acetaminophen (Tylenol) while taking the prescribed pain medication. Ibuprofen, Aleve, Motrin, or Naproxen are okay but should not be taken on an empty stomach. *Watch for these significant complications:  Call physician if these or any other problems occur:  Fever over 101°, redness, swelling or warmth at the operative site  Unrelieved nausea    Foul smelling or cloudy drainage at the operative site   Unrelieved pain  Breathing issues such as shortness of breath or difficulty breathing    Blood soaked dressing. (Some oozing may be normal)     Numb, pale, blue, cold or tingling extremity       Make an appointment to be seen 8-10 days after surgery  FOLLOW-UP CARE:    Dr. Barbara Marmolejo.  [address]  [address]  [phone]

## 2022-09-07 NOTE — OP NOTE
[address] [address]                                OPERATIVE REPORT    PATIENT NAME: Monica Dickey                      :        1980  MED REC NO:   [id number]                            ROOM:  ACCOUNT NO:   [de-identified]                           ADMIT DATE: 2022  PROVIDER:     Jessie Colin MD    DATE OF PROCEDURE:  2022    PREOPERATIVE DIAGNOSES:  1. Left small finger laceration with flexor digitorum profundus and  superficialis zone II lacerations. 2.  Left ulnar digital neurovascular bundle laceration. POSTOPERATIVE DIAGNOSES:  1. Left small finger laceration with flexor digitorum profundus and  superficialis zone II lacerations. 2.  Left ulnar digital neurovascular bundle laceration. PROCEDURES PERFORMED:  1. Left zone II flexor digitorum profundus repair. 2.  Left zone II flexor digitorum superficialis repair, radial slip. 3.  Left ulnar digital nerve repair using 9-0 epineural repair with use  of intraoperative microscope. ANESTHESIA:  1. General.  2.  Local anesthetic by surgeon consisting of approximately 10 mL of  0.25% Marcaine plain. ASSISTANTS:  1. Nithin Frias, physician assistant certified. She was present  throughout the procedure. Her assistance was used for preoperative  positioning, intraoperative retraction, closure, and dressing  application. Her assistance expedited the case and decreased the  surgical time. 2.   _____ Livan Urbina, orthopedic surgery resident. TOURNIQUET TIME:  40 minutes. BLOOD LOSS:  Minimal.    FINDINGS:  1. Complete laceration of flexor digitorum profundus at the level of  the A3 pulley with proximal retraction of the profundus and  superficialis. 2.  There was complete laceration of the ulnar neurovascular bundle.   3..  Status post zone II flexor digitorum profundus and superficialis  repair, there was smooth excursion of the underlying tendons. The ulnar  slip was removed as this was quite small and diminutive. 4.  Intraoperative microscope was used to repair the ulnar digital  nerve. A tension-free repair was achieved. No tension with digital  range of motion. SPECIMENS:  None. COMPLICATIONS:  None. DISPOSITION:  The patient was stable throughout the procedure. OPERATIVE INDICATIONS:  The patient is a 67-year-old gentleman who  sustained a laceration to his left small finger, seen in the office, has  complete loss of function of the flexor tendons as well as numbness in  the ulnar digital nerve distribution. Risks, benefits, alternatives,  and complications of surgical intervention were explained including, but  not limited to the risk of infection; damage to nerves, vessels, or  tendons; re-rupture; stiffness; adhesions; need for compliance with  postop recommendations including skilled therapy, possible need for  additional surgery. He voiced understanding and wished to proceed. SURGERY IN DETAIL:  The patient was identified in the holding area. The  left small finger was identified as the surgical site. He was then seen  by Anesthesia, taken to the operating room, placed supine on the table,  and underwent general anesthesia per Anesthesia Department. All bony  prominences were well padded. A well-padded arm tourniquet was placed. The left upper extremity was prepped and draped in standard sterile  fashion. Perioperative antibiotics were administered. Arm was  exsanguinated. Tourniquet inflated to 250 mmHg. Total tourniquet time  was approximately 40 minutes. A transverse laceration at the level of the metacarpophalangeal joint  flexion crease was then opened up with removal of the previously placed  Prolene sutures. A Z-plasty was undertaken in a Miguel Carls type fashion  across this level. A loupe magnification was used to identify the ulnar  neurovascular bundle.   This was completely lacerated at the level of the  injury. This bundle was mobilized for later repair. Flaps were  elevated. The radial neurovascular bundle was found to be intact. There was complete loss of the flexor tendons. This occurred just at  the level of the proximal leading edge of the A3 pulley. The A3 pulley  was released distally. The A4 pulley was preserved. Two slips of the  superficialis were present at this level. The profundus was able to be  retrieved with hyperflexion of the distal interphalangeal joint  distally. Hemostat was passed proximally beneath the A2 pulley to  retrieve the flexor tendons without success; therefore, the incision was  extended proximally and the tendons were identified at this level. A  milking maneuver was then used to retrieve the tendons distally. The  ulnar slip of the flexor digitorum superficialis was found to be  significantly deficient. This was resected with a 15-blade scalpel to  smooth margins. The radial superficialis was then repaired with a 4-0  looped FiberWire suture to its radial slip. The profundus was then  mobilized distally. Smooth tendinous edges were then ensured and a  double-locking cruciate anastomosis of 4-0 looped FiberWire was used to  achieve a primary repair of the profundus. Venting was performed  distally to allow full extent of the tendon distally to allow full  digital extension. There was no tension. There was no gapping and  there was smooth excursion of the tendons with range of motion. The microscope was then brought in. A background was fashioned with a  torsion of the Esmarch. An 11 blade and a tongue depressor were used to  achieve smooth nerve ends with healthy fascicles proximally and  distally. Under the microscope magnification, a 9-0 nylon suture was  used to achieve an epineural repair. Status post repair, the digit was  brought through a range of motion. There was no gapping of the nerve  repair.   With this accomplished, the microscope was removed. The wound  was thoroughly and copiously irrigated out. The tourniquet was  deflated. Total tourniquet time was 40 minutes. The skin was then  closed with multiple nylon sutures. A sterile dressing and a splint  with the wrist in flexion and the digits in full flexion across the  wrist was then applied. The patient is scheduled to begin early flexor  tendon rehab incorporating passive flexion, active extension exercises,  splint fabrication, and modalities.         Champ Ortega MD    D: 09/06/2022 10:51:09       T: 09/06/2022 10:54:32     [id number]  Job#: [id number]     Doc#: [id number]    CC:

## 2022-09-08 ENCOUNTER — TREATMENT (OUTPATIENT)
Dept: OCCUPATIONAL THERAPY | Age: 42
End: 2022-09-08
Payer: COMMERCIAL

## 2022-09-08 DIAGNOSIS — S61.219A FINGER LACERATION INVOLVING TENDON, INITIAL ENCOUNTER: Primary | ICD-10-CM

## 2022-09-08 PROCEDURE — 97760 ORTHOTIC MGMT&TRAING 1ST ENC: CPT | Performed by: OCCUPATIONAL THERAPIST

## 2022-09-08 PROCEDURE — 97110 THERAPEUTIC EXERCISES: CPT | Performed by: OCCUPATIONAL THERAPIST

## 2022-09-08 NOTE — PROGRESS NOTES
OCCUPATIONAL THERAPY EVALUATION/Splint Application Only   [address]  [address] [id number]  Dept: [phone]  Loc: [phone]   Tiffani Nunez [facility] OT Fax: [phone]     Date:  2022     Patient Name:  Héctor Shafer    :  1980    Restrictions/Precautions:  follow protocol, low fall risk  Diagnosis:  J96.559H (ICD-10-CM) - Laceration of finger of left hand, foreign body presence unspecified, nail damage status unspecified, unspecified finger, initial encounter       Date of Surgery/Injury: 22 Lt small finger flexor tendon repair, possible nerve repair    Insurance/Certification information: [facility]  Plan of care signed (Y/N): N  Visit# / total visits:     Referring Practitioner:  Yana Hare MD  Specific Practitioner Orders: Please schedule patient on 22 for splint fabrication, active extension, passive flexion, modalities PRN      Past Medical History:   Past Medical History:   Diagnosis Date    Finger laceration involving tendon     left small finger with tendon and nerve involvement       Past Surgical History:   Past Surgical History:   Procedure Laterality Date    HAND TENDON SURGERY Left 2022    LEFT SMALL FINGER FLEXOR TENDON AND NERVE REPAIR AS NEEDED performed by Lalo Durand MD at [address]        Reason for Referral: Pt is a 40year old male who presents this date for splint fabrication s/p flexor tendon repair performed on 22. Surgery detailed below. Pt to be seen at another facility for treatment, patient presents this date for splint fabrication and initial HEP instructions. PROCEDURES PERFORMED to the left small finger:  1. Left zone II flexor digitorum profundus repair. 2.  Left zone II flexor digitorum superficialis repair, radial slip. 3.  Left ulnar digital nerve repair using 9-0 epineural repair with use  of intraoperative microscope.     Splint Fabricated/Provided: dorsal blocking splint in protected position-MF, RF and SF involvement     Wound Care: Post operative splint and dressing removed, wound cleaned, instructed in dressing changes and signs of infection. No signs of infection this date-wound cleaned, minimal bleeding and seepage. Education Provided: Patient was provided with verbal education/handout regarding splint care, wear, precautions, and hygiene    Splint Care: Splint can be washed with mild soap and cool water. Splint needs to air dry. Avoid leaving splint in or near a source of heat such as a hot car or a space heater. Use nail polish remover to remove stains on splint. Use Purell will decrease any odor that may develop. Splint Precuations: Patient was instructed to remove splint and contact therapist if the following occur:   1. Redness that lasts longer that 15-20 mins   2. Increased swelling   3. Increased pain   4. Pressure Sores    Wear Schedule: 24/7 except for wound care    Rehab Potential: Fair +   Recommendations: Outpatient OT  Goal Formulation: Patient  Requires OT Follow Up: Yes    Plan   Plan: Plan of care initiated. Pt was seen today for splint only. Pt may need an additional visit for splint modification. We will place pt on hold at this time until MD provides therapy clearance. Once therapy cleared, further assessment and goals will be completed. Goals (1 session):  1. Patient will verbalize and demo ability to don/doff splint appropriately in 1 session with good accuracy   Goal Met   2. Patient will verbalize understanding of splint precautions, splint care, and wear schedule in 1 session   Goal Met  3. Patient will demonstrate understand of stretching and/or exercise provided in 1 session. Goal Met    Please review Patient's OT evaluation and if you agree sign/date.     Time In: 2:00          Time Out: 3:15                    Treatment Charges: Mins Time  Units   Ther Exercise 30 2:45-3:15 2   Manual Therapy Thera Activities      ADL/Home Mgt       Neuro Re-education      Gait Training      Group Therapy      Orthotic fit/train 39 2:00-2:45 3   Other         Total Time/Units 75   5        Physician's Signature:____________________________ Date:__________________       Gilford Liverpool, OTR/KELLEY #[id number]

## 2022-09-15 ENCOUNTER — OFFICE VISIT (OUTPATIENT)
Dept: ORTHOPEDIC SURGERY | Age: 42
End: 2022-09-15

## 2022-09-15 VITALS — WEIGHT: 140 LBS | BODY MASS INDEX: 20.04 KG/M2 | HEIGHT: 70 IN

## 2022-09-15 DIAGNOSIS — S61.219A LACERATION OF FINGER OF LEFT HAND WITH TENDON INVOLVEMENT, INITIAL ENCOUNTER: Primary | ICD-10-CM

## 2022-09-15 PROCEDURE — 99024 POSTOP FOLLOW-UP VISIT: CPT | Performed by: ORTHOPAEDIC SURGERY

## 2022-09-15 NOTE — PROGRESS NOTES
9 days postop left small finger zone 2 flexor digitorum profundus superficialis and ulnar neurovascular bundle repair doing well. He has been doing home exercises which were demonstrated to him with therapy. He does have his rehab splint with him. Physical exam: Small finger incision healing nice. Prescribe refill the finger. Remains with diminished sensation in ulnar digital nerve distribution. He does demonstrate a good place and hold to his small finger today in the office. Assessment 9 days postop    Plan    He is set up for therapy. Continue with flexor tendon protocol. I like him to focus on therapy at this time. He remain out of work until he is established with therapy. He will follow-up in approximately 4 weeks. He will be seen back next week for nurse visit for suture removal.  Plan for possible modified work duty after next visit. All questions answered.

## 2022-09-20 ENCOUNTER — NURSE ONLY (OUTPATIENT)
Dept: ORTHOPEDIC SURGERY | Age: 42
End: 2022-09-20

## 2022-09-20 NOTE — PROGRESS NOTES
14 days out left small finger zone 2 flexor tendon repair. He's been attending hand therapy in [place]. Incision c/d/i with sutures in place. No erythema or signs of infection. A few sutures were removed from the distal end of the incision. His incision did open slightly. Patient was instructed to keep incision covered and return this Friday for a nurse check.  Her verbalized understanding

## 2022-10-17 ENCOUNTER — OFFICE VISIT (OUTPATIENT)
Dept: ORTHOPEDIC SURGERY | Age: 42
End: 2022-10-17

## 2022-10-17 VITALS — RESPIRATION RATE: 18 BRPM | BODY MASS INDEX: 20.04 KG/M2 | WEIGHT: 140 LBS | HEIGHT: 70 IN

## 2022-10-17 DIAGNOSIS — S61.219A LACERATION OF FINGER OF LEFT HAND WITH TENDON INVOLVEMENT, INITIAL ENCOUNTER: Primary | ICD-10-CM

## 2022-10-17 PROCEDURE — 99024 POSTOP FOLLOW-UP VISIT: CPT | Performed by: ORTHOPAEDIC SURGERY

## 2022-10-17 NOTE — PROGRESS NOTES
6 weeks postop left small finger zone 2 flexor digitorum profundus superficialis and ulnar neurovascular bundle repair doing well. Patient is in therapy. He just started active range of motion. He states overall he is doing well. Physical exam: Small finger incision healing well. There is thickened scar tissue present. Remains with diminished but intact sensation in ulnar digital nerve distribution. He does demonstrate a good place and hold to his small finger today in the office. There is little pull through of the FDP and FDS tendon. Brisk capillary refill. Otherwise NVI. Assessment 6 weeks postop    Plan    Continue therapy. Did discuss he has scar adhesions present and limited pull through of his tendons. Patient to wean out of his brace within the next week and slowly advance activities. Okay to start strengthening at 8 weeks post op. Follow up in 4-6 weeks. I have seen and evaluated the patient and agree with the above assessment and plan on today's visit. I have performed the key components of the history and physical examination with significant findings of postop with dense scar adhesions. Advance in therapy as tolerated. I concur with the findings and plan as documented.     Arelis Talavera MD  10/17/2022

## 2022-11-17 ENCOUNTER — OFFICE VISIT (OUTPATIENT)
Dept: ORTHOPEDIC SURGERY | Age: 42
End: 2022-11-17

## 2022-11-17 VITALS — RESPIRATION RATE: 18 BRPM | HEIGHT: 70 IN | BODY MASS INDEX: 20.04 KG/M2 | WEIGHT: 140 LBS

## 2022-11-17 DIAGNOSIS — S61.219A LACERATION OF FINGER OF LEFT HAND WITH TENDON INVOLVEMENT, INITIAL ENCOUNTER: Primary | ICD-10-CM

## 2022-11-17 DIAGNOSIS — S64.40XA LACERATION OF DIGITAL NERVE OF FINGER, INITIAL ENCOUNTER: ICD-10-CM

## 2022-11-17 DIAGNOSIS — M24.542 CONTRACTURE OF FINGER JOINT, LEFT: ICD-10-CM

## 2022-11-17 DIAGNOSIS — M67.80 ADHESIONS, FLEXOR OR EXTENSOR TENDONS: ICD-10-CM

## 2022-11-17 PROCEDURE — 99024 POSTOP FOLLOW-UP VISIT: CPT | Performed by: ORTHOPAEDIC SURGERY

## 2022-11-17 NOTE — PROGRESS NOTES
10 weeks postop left small finger zone 2 flexor digitorum profundus superficialis and ulnar neurovascular bundle repair doing well. Patient is in therapy. He reports stiffness to his finger. They are working on this in therapy. Physical exam: Small finger incision healing well. There is thickened scar tissue present. Remains with diminished but intact sensation in ulnar digital nerve distribution. Small finger PIP joint with 70 degree flexion contracture. There is little pull through of the FDP and FDS tendon. Brisk capillary refill. Otherwise NVI. Assessment 10 weeks postop now with flexor tendon adhesions and small finger PIP joint flexion contracture     Plan    Discussed findings with patient. Patient to advance in therapy. No restrictions. Did discuss potentially adding LMB splinting. We will ask for additional diagnoses of flexor tendon adhesions and PIP joint contracture of the small finger. Did discuss he most likely will require a flexor tenolysis with PIP joint contracture release once the soft tissues are ready to proceed with additional surgery. Okay to RTW 11/30/22 with no restrictions. Follow up in 2-3 months to assess response to therapy and to assess soft tissues. All questions answered. I have seen and evaluated the patient and agree with the above assessment and plan on today's visit. I have performed the key components of the history and physical examination with significant findings of 10 weeks postop now with significant PIP joint flexion contracture of 70 to 75 degrees with significant flexor tendon adhesions. Discussed LMB splinting which was provided. Discussed continued stretching. Patient feels that he may be able to return to work without restrictions although he does have a significant contracture of the little finger.   Discussed once the soft tissues improve second stage procedure involving flexor tendon release/tenolysis and PIP joint contracture release as needed. I concur with the findings and plan as documented.     Coretta Paget, MD  11/17/2022

## 2023-02-02 ENCOUNTER — OFFICE VISIT (OUTPATIENT)
Dept: ORTHOPEDIC SURGERY | Age: 43
End: 2023-02-02

## 2023-02-02 VITALS — HEIGHT: 70 IN | WEIGHT: 140 LBS | BODY MASS INDEX: 20.04 KG/M2

## 2023-02-02 DIAGNOSIS — M67.80 ADHESIONS, FLEXOR OR EXTENSOR TENDONS: Primary | ICD-10-CM

## 2023-02-02 DIAGNOSIS — M24.542 CONTRACTURE OF FINGER JOINT, LEFT: ICD-10-CM

## 2023-02-02 NOTE — PROGRESS NOTES
Chief Complaint   Patient presents with    Follow Up After Procedure     5 months out, zone 2 flexor digitorum profundus superficialis and ulnar neurovascular bundle repair          Kleber Rivera is a 40y.o. year old  who presents for follow up of left middle finger injury contracture status post flexor tendon repair and nerve repairs. Reports that over the past month he has noticed improvement in his digital extension with use of his dynamic splint to the little finger. Overall he still reports a dysfunction of the finger secondary to the contracture. However he does feel things are improving. Past Medical History:   Diagnosis Date    Finger laceration involving tendon     left small finger with tendon and nerve involvement     Past Surgical History:   Procedure Laterality Date    HAND TENDON SURGERY Left 9/6/2022    LEFT SMALL FINGER FLEXOR TENDON AND NERVE REPAIR AS NEEDED performed by Marquis Alvarado MD at [address]     No current outpatient medications on file. No Known Allergies  Social History     Socioeconomic History    Marital status:      Spouse name: Not on file    Number of children: Not on file    Years of education: Not on file    Highest education level: Not on file   Occupational History    Not on file   Tobacco Use    Smoking status: Never    Smokeless tobacco: Never   Vaping Use    Vaping Use: Never used   Substance and Sexual Activity    Alcohol use: Never    Drug use: Never    Sexual activity: Not on file   Other Topics Concern    Not on file   Social History Narrative    Not on file     Social Determinants of Health     Financial Resource Strain: Not on file   Food Insecurity: Not on file   Transportation Needs: Not on file   Physical Activity: Not on file   Stress: Not on file   Social Connections: Not on file   Intimate Partner Violence: Not on file   Housing Stability: Not on file     History reviewed. No pertinent family history.     Skin: (-) rash,(-) psoriasis,(-) eczema, (-)skin cancer. Musculoskeletal: left little finger conctarcture  Neurologic: (-) epilepsy, (-)seizures,(-) brain tumor,(-) TIA, (-)stroke, (-)headaches, (-)Parkinson disease,(-) memory loss, (-) LOC. Cardiovascular: (-) Chest pain, (-) swelling in legs/feet, (-) SOB, (-) cramping in legs/feet with walking. SUBJECTIVE:      Constitutional:    The patient is alert and oriented x 3, appears to be stated age and in no distress. Physical exam: There is thickened scar tissue present. Remains with diminished but intact sensation in ulnar digital nerve distribution. Small finger PIP joint with 60 degree flexion contracture. There is little pull through of the FDP and FDS tendon. Brisk capillary refill. Otherwise NVI. Impression:   Encounter Diagnoses   Name Primary? Adhesions, flexor or extensor tendons Yes    Contracture of finger joint, left        Plan:     Have a discussed treatment options with him at this time. He does feel like he is making progress with his dynamic splint and like to continue with this for now. I did explain to him PIP joint contracture release with flexor tenolysis procedure. He understands this may or may not alleviate all of his symptoms. Lab and follow-up in 2 to 3 months of continued dynamic splinting.

## (undated) DEVICE — PADDING UNDERCAST W4INXL4YD COT FBR LO LINTING WYTEX

## (undated) DEVICE — SPLINT ORTH W4XL15IN PLSTR OF PARIS LO EXOTHERM SMOOTH

## (undated) DEVICE — ZIMMER® STERILE DISPOSABLE TOURNIQUET CUFF WITH PLC, DUAL PORT, SINGLE BLADDER, 18 IN. (46 CM)

## (undated) DEVICE — INTENDED FOR TISSUE SEPARATION, AND OTHER PROCEDURES THAT REQUIRE A SHARP SURGICAL BLADE TO PUNCTURE OR CUT.: Brand: BARD-PARKER ® STAINLESS STEEL BLADES

## (undated) DEVICE — PADDING,UNDERCAST,COTTON, 4"X4YD STERILE: Brand: MEDLINE

## (undated) DEVICE — CRADLE ARM W8.75XH12.5XL16IN FOAM SUPP ELEVATION VENT

## (undated) DEVICE — TOWEL,OR,DSP,ST,BLUE,STD,6/PK,12PK/CS: Brand: MEDLINE

## (undated) DEVICE — GLOVE SURG SZ 65 L12IN FNGR THK79MIL GRN LTX FREE

## (undated) DEVICE — 4-PORT MANIFOLD: Brand: NEPTUNE 2

## (undated) DEVICE — PADDING,UNDERCAST,COTTON, 3X4YD STERILE: Brand: MEDLINE

## (undated) DEVICE — SPEAR SURG TRIANG SHP HNDL PCH WECK-CEL

## (undated) DEVICE — GLOVE ORANGE PI 8 1/2   MSG9085

## (undated) DEVICE — DOUBLE BASIN SET: Brand: MEDLINE INDUSTRIES, INC.

## (undated) DEVICE — SUTURE FIBERLOOP 4-0 L10IN NONABSORBABLE BLU L17.9MM 3/8 AR722920

## (undated) DEVICE — GLOVE SURG SZ 6 THK91MIL LTX FREE SYN POLYISOPRENE ANTI

## (undated) DEVICE — SURGICAL PROCEDURE PACK HND

## (undated) DEVICE — CLOTH SURG PREP PREOPERATIVE CHLORHEXIDINE GLUC 2% READYPREP

## (undated) DEVICE — Device

## (undated) DEVICE — SOLUTION IV IRRIG POUR BRL 0.9% SODIUM CHL 2F7124